# Patient Record
Sex: FEMALE | Race: WHITE | NOT HISPANIC OR LATINO | URBAN - METROPOLITAN AREA
[De-identification: names, ages, dates, MRNs, and addresses within clinical notes are randomized per-mention and may not be internally consistent; named-entity substitution may affect disease eponyms.]

---

## 2018-01-06 ENCOUNTER — EMERGENCY (EMERGENCY)
Facility: HOSPITAL | Age: 58
LOS: 0 days | Discharge: HOME | End: 2018-01-07
Attending: EMERGENCY MEDICINE | Admitting: EMERGENCY MEDICINE
Payer: COMMERCIAL

## 2018-01-06 VITALS — WEIGHT: 128.09 LBS | HEIGHT: 64 IN
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
## 2018-01-06 DIAGNOSIS — S09.90XA UNSPECIFIED INJURY OF HEAD, INITIAL ENCOUNTER: ICD-10-CM
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
## 2018-01-06 DIAGNOSIS — S89.81XA OTHER SPECIFIED INJURIES OF RIGHT LOWER LEG, INITIAL ENCOUNTER: ICD-10-CM

## 2018-01-06 DIAGNOSIS — S06.5X0A TRAUMATIC SUBDURAL HEMORRHAGE WITHOUT LOSS OF CONSCIOUSNESS, INITIAL ENCOUNTER: ICD-10-CM

## 2018-01-06 PROCEDURE — 70450 CT HEAD/BRAIN W/O DYE: CPT | Mod: 26

## 2018-01-06 PROCEDURE — 99285 EMERGENCY DEPT VISIT HI MDM: CPT | Mod: 25

## 2018-01-06 PROCEDURE — 73564 X-RAY EXAM KNEE 4 OR MORE: CPT | Mod: 26,RT

## 2018-01-06 RX ORDER — OXYCODONE AND ACETAMINOPHEN 5; 325 MG/1; MG/1
1 TABLET ORAL ONCE
Qty: 0 | Refills: 0 | Status: DISCONTINUED | OUTPATIENT
Start: 2018-01-06 | End: 2018-01-06

## 2018-01-06 RX ADMIN — OXYCODONE AND ACETAMINOPHEN 1 TABLET(S): 5; 325 TABLET ORAL at 21:22

## 2018-01-06 NOTE — ED STATDOCS - ENMT, MLM
No battles sign, no clinical evidence of facial injury, Neck is NT, AFROM. Nasal mucosa clear.  Mouth with slightly dry mucosa  Throat has no vesicles, no oropharyngeal exudates and uvula is midline.

## 2018-01-06 NOTE — ED STATDOCS - MUSCULOSKELETAL, MLM
range of motion is not limited and there is no muscle tenderness. No midline spinal ttp. Pelvis is NT, stable. R hip slightly ttp over the lateral aspect, no groin ttp. R knee +ttp over the patella, no effusion, AFROM, joints stable.

## 2018-01-06 NOTE — ED ADULT TRIAGE NOTE - CHIEF COMPLAINT QUOTE
Pt fell while ice skating yesterday, struck head and R knee.  No LOC, fall witnessed.  Pt has headache now.  No anticoagulants

## 2018-01-06 NOTE — ED STATDOCS - OBJECTIVE STATEMENT
56 y/o F with Hx of migraines, and depression presents to ED for evaluation s/p multiple falls while ice skating yesterday. Pt states while ice skating she slipped and fell striking her right knee once and her head the other time. No LOC. +HA. She was assisted to get up and was able to walk after the fall. No nausea, difficulty concentrating. +decreased appetite, neck soreness. Bi weakness, numbness, or tingling, incontinence. Sx are not similar to past migraines. 56 y/o F with Hx of migraines, and depression presents to ED for evaluation s/p multiple falls while ice skating yesterday. Pt states while ice skating she slipped and fell striking her right knee once and her head the other time. No LOC. +HA. She was assisted to get up and was able to walk after the fall. No nausea, difficulty concentrating. +decreased appetite, neck soreness.  No weakness, numbness, or tingling, incontinence. Sx are not similar to past migraines. 58 y/o F with Hx of migraines, and depression presents to ED for evaluation s/p couple falls while ice skating yesterday. Pt states while ice skating she slipped and fell striking her right knee once and her head the other time. No LOC. +HA. She was assisted to get up and was able to walk after the fall. No nausea, difficulty concentrating. +decreased appetite, neck soreness.  No weakness, numbness, or tingling, incontinence. Sx are not similar to past migraines.

## 2018-01-06 NOTE — ED STATDOCS - MEDICAL DECISION MAKING DETAILS
58 y/o F no anticoagulants, presents to ED c/o diffuse HA s/p slip on ice with impact to occiput and right patella pain. Neuro intact. Plan CT W/O Con, XR R knee and patella.

## 2018-01-06 NOTE — ED STATDOCS - SKIN, MLM
+nonacute scalp cysts, +scalp hematoma with intact overlying skin. +nonacute scalp cysts, +small occipital scalp hematoma with intact overlying skin.

## 2018-01-06 NOTE — ED STATDOCS - CARE PLAN
Principal Discharge DX:	Traumatic subdural hematoma without loss of consciousness, initial encounter  Secondary Diagnosis:	Injury of head, initial encounter  Secondary Diagnosis:	Fall from slipping on ice, initial encounter

## 2018-01-06 NOTE — ED STATDOCS - ATTENDING CONTRIBUTION TO CARE
I, Napoleon Womack MD, personally saw the patient with the PA, and completed the key components of the history and physical exam. I then discussed the management plan with the PA.  Dr. Womack: I have personally performed a face to face bedside history and physical examination of this patient. I have discussed the history, examination, review of systems, assessment and plan of management with the resident. I have reviewed the electronic medical record and amended it to reflect my history, review of systems, physical exam, assessment and plan.

## 2018-01-06 NOTE — ED ADULT NURSE NOTE - OBJECTIVE STATEMENT
Pt is s/p fall while ice skating, hitting back of head and knee; denies LOC, denies dizziness/lightheadedness, denies CP, SOB, denies N/V, denies visual disturbances. Neurocheck WNL: A&Ox4, PERRLA, GCS=15 Pt is s/p fall while ice skating, hitting back of head and knee; denies LOC, denies dizziness/lightheadedness, denies CP, SOB, denies N/V, denies visual disturbances. Neurocheck WNL: A&Ox4, PERRLA, GCS=15.

## 2018-01-06 NOTE — ED STATDOCS - PROGRESS NOTE DETAILS
Dr. Womack:  CT head verbal report: + small blood interhemispheric fissure.  written report still pending.  PA informed, to f/u written report & d/w SARTHAK. ABDULLAHI Lloyd:  Pt was seen and examined by intake provider. Pt presented with HA s/p multiple falls yesterday while ice skating. Denies any LOC. CT head positive for possible subdural/subarachnoid hemorrhage. d/w Dr. Taylor Lance on call, aware of the CT findings, since the incident occurred a day ago will get repeat CTH at [facility] and discuss with Dr. Vazquez. discuss with patient, aware of the CT findings and the plan. JW Pt signed out to me in stable condition in NAD.  Agree with history above.  Initial CT head read as possible subdural hematoma VS SAH.  Neurosurgery consulted prior to sign out recommended repeat CTH.  At this time VS stable WNL.  Physical Exam: General: alert and oriented x3 in no acute distress.  Cardiovascular: Regular rate and rhythm, S1 and S2 normal.  No murmurs, rubs, or gallops.  Pulses equal bilaterally.  No carotid bruits.  No peripheral edema or JVD.  Respiratory: No respiratory distress.  Lungs clear to auscultation bilaterally without adventitial breath sounds.  Neurological: AOx3 NAD.  Cranial nerves I-XII intact.  Normal gross motor and sensory function. Pt ambulatory. 5/5 muscular strength with normal bulk and tone.  DTRs 2+ bilaterally.  No dysmetria or dysdiadochokinesia.  No focal neurological deficit.  No neck stiffness, photophobia, rash, or meningismus.  +Palpable hematoma on the right lateral parietal scalp.  No obvious skull fracture or depression.   No ramirez sign, raccoon eyes, CSF rhinorrhea, CSF otorrhea, hematotympanum, or other sign of basilar skull fracture.  No maxillary or facial ecchymosis, hematoma, deformity, or palpable tenderness.   No septal hematoma. No midline cervical, thoracic, or lumbar tenderness, step off, fracture, or deformity.  No sign of thoracic trama.  No sign of abdominal or pelvic trauma.  No sign of extremity trauma.  A/P At this time Pt neurologically intact.  Repeat CT scan ordered.  Basic labs, additional medication, and precautions ordered.  Evaluation in progress. Dr. Womack:  Repeat head CT w/o interval change.  Paging SARTHAK on call. Dr. Womack:  Case d/w SARTHAK PA on call, who was informed of case by Dr. Vazquez.  Since repeat head CT w/o change, incident > 24 hrs. ago, & pt remains neuro intact, no indication for admission nor any acute SARTHAK intervention at this time.  Pt TBD for outpt SARTHAK f/u this upcoming week.     Reevaluated patient at bedside.  Patient feeling improved, HA down to 3/10 & easily manageable.  No neuro c/o's, repeat neuro exam intact.   Discussed the results of all diagnostic testing in ED and copies of all reports given.   An opportunity to ask questions was given.  Discussed the importance of prompt, close medical follow-up.  Patient will return with any changes, concerns or persistent / worsening symptoms.  Understanding of all instructions verbalized.

## 2018-01-06 NOTE — ED STATDOCS - NEUROLOGICAL, MLM
sensation is normal and strength is normal. sensation is normal and strength is normal.  A+O x 3.  CNs II - XII intact.  Normal speech.  No focal motor/sensory deficits.  Normal gait.

## 2018-01-07 VITALS
HEART RATE: 66 BPM | RESPIRATION RATE: 19 BRPM | DIASTOLIC BLOOD PRESSURE: 64 MMHG | OXYGEN SATURATION: 100 % | SYSTOLIC BLOOD PRESSURE: 118 MMHG

## 2018-01-07 LAB
ALBUMIN SERPL ELPH-MCNC: 4 G/DL — SIGNIFICANT CHANGE UP (ref 3.3–5)
ALP SERPL-CCNC: 91 U/L — SIGNIFICANT CHANGE UP (ref 40–120)
ALT FLD-CCNC: 20 U/L — SIGNIFICANT CHANGE UP (ref 12–78)
ANION GAP SERPL CALC-SCNC: 7 MMOL/L — SIGNIFICANT CHANGE UP (ref 5–17)
APTT BLD: 34.1 SEC — SIGNIFICANT CHANGE UP (ref 27.5–37.4)
AST SERPL-CCNC: 15 U/L — SIGNIFICANT CHANGE UP (ref 15–37)
BASOPHILS # BLD AUTO: 0.1 K/UL — SIGNIFICANT CHANGE UP (ref 0–0.2)
BASOPHILS NFR BLD AUTO: 1.5 % — SIGNIFICANT CHANGE UP (ref 0–2)
BILIRUB SERPL-MCNC: 0.3 MG/DL — SIGNIFICANT CHANGE UP (ref 0.2–1.2)
BUN SERPL-MCNC: 11 MG/DL — SIGNIFICANT CHANGE UP (ref 7–23)
CALCIUM SERPL-MCNC: 9.2 MG/DL — SIGNIFICANT CHANGE UP (ref 8.5–10.1)
CHLORIDE SERPL-SCNC: 107 MMOL/L — SIGNIFICANT CHANGE UP (ref 96–108)
CO2 SERPL-SCNC: 26 MMOL/L — SIGNIFICANT CHANGE UP (ref 22–31)
CREAT SERPL-MCNC: 0.85 MG/DL — SIGNIFICANT CHANGE UP (ref 0.5–1.3)
EOSINOPHIL # BLD AUTO: 0.2 K/UL — SIGNIFICANT CHANGE UP (ref 0–0.5)
EOSINOPHIL NFR BLD AUTO: 2.8 % — SIGNIFICANT CHANGE UP (ref 0–6)
GLUCOSE SERPL-MCNC: 94 MG/DL — SIGNIFICANT CHANGE UP (ref 70–99)
HCT VFR BLD CALC: 40.2 % — SIGNIFICANT CHANGE UP (ref 34.5–45)
HGB BLD-MCNC: 13.1 G/DL — SIGNIFICANT CHANGE UP (ref 11.5–15.5)
INR BLD: 0.91 RATIO — SIGNIFICANT CHANGE UP (ref 0.88–1.16)
LYMPHOCYTES # BLD AUTO: 2.3 K/UL — SIGNIFICANT CHANGE UP (ref 1–3.3)
LYMPHOCYTES # BLD AUTO: 42.4 % — SIGNIFICANT CHANGE UP (ref 13–44)
MCHC RBC-ENTMCNC: 30.9 PG — SIGNIFICANT CHANGE UP (ref 27–34)
MCHC RBC-ENTMCNC: 32.6 GM/DL — SIGNIFICANT CHANGE UP (ref 32–36)
MCV RBC AUTO: 94.8 FL — SIGNIFICANT CHANGE UP (ref 80–100)
MONOCYTES # BLD AUTO: 0.6 K/UL — SIGNIFICANT CHANGE UP (ref 0–0.9)
MONOCYTES NFR BLD AUTO: 10.3 % — SIGNIFICANT CHANGE UP (ref 2–14)
NEUTROPHILS # BLD AUTO: 2.4 K/UL — SIGNIFICANT CHANGE UP (ref 1.8–7.4)
NEUTROPHILS NFR BLD AUTO: 42.9 % — LOW (ref 43–77)
PLATELET # BLD AUTO: 243 K/UL — SIGNIFICANT CHANGE UP (ref 150–400)
POTASSIUM SERPL-MCNC: 3.6 MMOL/L — SIGNIFICANT CHANGE UP (ref 3.5–5.3)
POTASSIUM SERPL-SCNC: 3.6 MMOL/L — SIGNIFICANT CHANGE UP (ref 3.5–5.3)
PROT SERPL-MCNC: 6.9 GM/DL — SIGNIFICANT CHANGE UP (ref 6–8.3)
PROTHROM AB SERPL-ACNC: 9.8 SEC — SIGNIFICANT CHANGE UP (ref 9.8–12.7)
RBC # BLD: 4.24 M/UL — SIGNIFICANT CHANGE UP (ref 3.8–5.2)
RBC # FLD: 11.6 % — SIGNIFICANT CHANGE UP (ref 10.3–14.5)
SODIUM SERPL-SCNC: 140 MMOL/L — SIGNIFICANT CHANGE UP (ref 135–145)
WBC # BLD: 5.5 K/UL — SIGNIFICANT CHANGE UP (ref 3.8–10.5)
WBC # FLD AUTO: 5.5 K/UL — SIGNIFICANT CHANGE UP (ref 3.8–10.5)

## 2018-01-07 PROCEDURE — 70450 CT HEAD/BRAIN W/O DYE: CPT | Mod: 26

## 2018-01-07 PROCEDURE — 71045 X-RAY EXAM CHEST 1 VIEW: CPT | Mod: 26

## 2018-01-07 RX ORDER — OXYCODONE AND ACETAMINOPHEN 5; 325 MG/1; MG/1
1 TABLET ORAL ONCE
Qty: 0 | Refills: 0 | Status: DISCONTINUED | OUTPATIENT
Start: 2018-01-07 | End: 2018-01-07

## 2018-01-07 RX ADMIN — OXYCODONE AND ACETAMINOPHEN 1 TABLET(S): 5; 325 TABLET ORAL at 00:09

## 2018-01-21 ENCOUNTER — TRANSCRIPTION ENCOUNTER (OUTPATIENT)
Age: 58
End: 2018-01-21

## 2018-11-28 ENCOUNTER — EMERGENCY (EMERGENCY)
Facility: HOSPITAL | Age: 58
LOS: 0 days | Discharge: HOME | End: 2018-11-29
Attending: EMERGENCY MEDICINE
Payer: COMMERCIAL

## 2018-11-28 ENCOUNTER — EMERGENCY (EMERGENCY)
Facility: HOSPITAL | Age: 58
LOS: 0 days | Discharge: HOME | End: 2018-11-29
Attending: EMERGENCY MEDICINE | Admitting: EMERGENCY MEDICINE
Payer: COMMERCIAL

## 2018-11-28 VITALS
HEART RATE: 87 BPM | SYSTOLIC BLOOD PRESSURE: 106 MMHG | OXYGEN SATURATION: 93 % | DIASTOLIC BLOOD PRESSURE: 65 MMHG | RESPIRATION RATE: 18 BRPM | TEMPERATURE: 98 F

## 2018-11-28 VITALS
HEIGHT: 63 IN | RESPIRATION RATE: 18 BRPM | WEIGHT: 149.91 LBS | OXYGEN SATURATION: 95 % | SYSTOLIC BLOOD PRESSURE: 130 MMHG | TEMPERATURE: 98 F | HEART RATE: 113 BPM | DIASTOLIC BLOOD PRESSURE: 77 MMHG

## 2018-11-28 VITALS — WEIGHT: 158.95 LBS | HEIGHT: 69 IN
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
## 2018-11-28 DIAGNOSIS — R45.851 SUICIDAL IDEATIONS: ICD-10-CM

## 2018-11-28 DIAGNOSIS — T40.601A POISONING BY UNSPECIFIED NARCOTICS, ACCIDENTAL (UNINTENTIONAL), INITIAL ENCOUNTER: ICD-10-CM
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
## 2018-11-28 DIAGNOSIS — N39.0 URINARY TRACT INFECTION, SITE NOT SPECIFIED: ICD-10-CM

## 2018-11-28 PROBLEM — G43.909 MIGRAINE, UNSPECIFIED, NOT INTRACTABLE, WITHOUT STATUS MIGRAINOSUS: Chronic | Status: ACTIVE | Noted: 2018-01-07

## 2018-11-28 LAB
ALBUMIN SERPL ELPH-MCNC: 4.3 G/DL — SIGNIFICANT CHANGE UP (ref 3.3–5)
ALP SERPL-CCNC: 78 U/L — SIGNIFICANT CHANGE UP (ref 40–120)
ALT FLD-CCNC: 39 U/L — SIGNIFICANT CHANGE UP (ref 12–78)
AMPHET UR-MCNC: NEGATIVE — SIGNIFICANT CHANGE UP
ANION GAP SERPL CALC-SCNC: 15 MMOL/L — SIGNIFICANT CHANGE UP (ref 5–17)
APAP SERPL-MCNC: 74 UG/ML — HIGH (ref 10–30)
APPEARANCE UR: CLEAR — SIGNIFICANT CHANGE UP
AST SERPL-CCNC: 32 U/L — SIGNIFICANT CHANGE UP (ref 15–37)
BACTERIA # UR AUTO: ABNORMAL
BARBITURATES UR SCN-MCNC: NEGATIVE — SIGNIFICANT CHANGE UP
BASOPHILS # BLD AUTO: 0.06 K/UL — SIGNIFICANT CHANGE UP (ref 0–0.2)
BASOPHILS NFR BLD AUTO: 0.4 % — SIGNIFICANT CHANGE UP (ref 0–2)
BENZODIAZ UR-MCNC: NEGATIVE — SIGNIFICANT CHANGE UP
BILIRUB SERPL-MCNC: 0.2 MG/DL — SIGNIFICANT CHANGE UP (ref 0.2–1.2)
BILIRUB UR-MCNC: NEGATIVE — SIGNIFICANT CHANGE UP
BUN SERPL-MCNC: 10 MG/DL — SIGNIFICANT CHANGE UP (ref 7–23)
CALCIUM SERPL-MCNC: 9.1 MG/DL — SIGNIFICANT CHANGE UP (ref 8.5–10.1)
CHLORIDE SERPL-SCNC: 112 MMOL/L — HIGH (ref 96–108)
CO2 SERPL-SCNC: 21 MMOL/L — LOW (ref 22–31)
COCAINE METAB.OTHER UR-MCNC: NEGATIVE — SIGNIFICANT CHANGE UP
COLOR SPEC: YELLOW — SIGNIFICANT CHANGE UP
CREAT SERPL-MCNC: 1.31 MG/DL — HIGH (ref 0.5–1.3)
DIFF PNL FLD: ABNORMAL
EOSINOPHIL # BLD AUTO: 0.01 K/UL — SIGNIFICANT CHANGE UP (ref 0–0.5)
EOSINOPHIL NFR BLD AUTO: 0.1 % — SIGNIFICANT CHANGE UP (ref 0–6)
EPI CELLS # UR: ABNORMAL
ETHANOL SERPL-MCNC: 96 MG/DL — HIGH (ref 0–10)
GLUCOSE SERPL-MCNC: 57 MG/DL — LOW (ref 70–99)
GLUCOSE UR QL: NEGATIVE MG/DL — SIGNIFICANT CHANGE UP
HCT VFR BLD CALC: 44.9 % — SIGNIFICANT CHANGE UP (ref 34.5–45)
HGB BLD-MCNC: 14.5 G/DL — SIGNIFICANT CHANGE UP (ref 11.5–15.5)
IMM GRANULOCYTES NFR BLD AUTO: 0.9 % — SIGNIFICANT CHANGE UP (ref 0–1.5)
KETONES UR-MCNC: NEGATIVE — SIGNIFICANT CHANGE UP
LEUKOCYTE ESTERASE UR-ACNC: ABNORMAL
LYMPHOCYTES # BLD AUTO: 1.25 K/UL — SIGNIFICANT CHANGE UP (ref 1–3.3)
LYMPHOCYTES # BLD AUTO: 8.3 % — LOW (ref 13–44)
MCHC RBC-ENTMCNC: 32.3 GM/DL — SIGNIFICANT CHANGE UP (ref 32–36)
MCHC RBC-ENTMCNC: 32.4 PG — SIGNIFICANT CHANGE UP (ref 27–34)
MCV RBC AUTO: 100.4 FL — HIGH (ref 80–100)
METHADONE UR-MCNC: NEGATIVE — SIGNIFICANT CHANGE UP
MONOCYTES # BLD AUTO: 1.07 K/UL — HIGH (ref 0–0.9)
MONOCYTES NFR BLD AUTO: 7.1 % — SIGNIFICANT CHANGE UP (ref 2–14)
NEUTROPHILS # BLD AUTO: 12.5 K/UL — HIGH (ref 1.8–7.4)
NEUTROPHILS NFR BLD AUTO: 83.2 % — HIGH (ref 43–77)
NITRITE UR-MCNC: NEGATIVE — SIGNIFICANT CHANGE UP
NRBC # BLD: 0 /100 WBCS — SIGNIFICANT CHANGE UP (ref 0–0)
OPIATES UR-MCNC: POSITIVE — SIGNIFICANT CHANGE UP
PCP SPEC-MCNC: SIGNIFICANT CHANGE UP
PCP UR-MCNC: POSITIVE — SIGNIFICANT CHANGE UP
PH UR: 5 — SIGNIFICANT CHANGE UP (ref 5–8)
PLATELET # BLD AUTO: 282 K/UL — SIGNIFICANT CHANGE UP (ref 150–400)
POTASSIUM SERPL-MCNC: 4.1 MMOL/L — SIGNIFICANT CHANGE UP (ref 3.5–5.3)
POTASSIUM SERPL-SCNC: 4.1 MMOL/L — SIGNIFICANT CHANGE UP (ref 3.5–5.3)
PROT SERPL-MCNC: 7.9 GM/DL — SIGNIFICANT CHANGE UP (ref 6–8.3)
PROT UR-MCNC: 30 MG/DL
RBC # BLD: 4.47 M/UL — SIGNIFICANT CHANGE UP (ref 3.8–5.2)
RBC # FLD: 12.2 % — SIGNIFICANT CHANGE UP (ref 10.3–14.5)
RBC CASTS # UR COMP ASSIST: ABNORMAL /HPF (ref 0–4)
SALICYLATES SERPL-MCNC: <1.7 MG/DL — LOW (ref 2.8–20)
SODIUM SERPL-SCNC: 148 MMOL/L — HIGH (ref 135–145)
SP GR SPEC: 1.02 — SIGNIFICANT CHANGE UP (ref 1.01–1.02)
THC UR QL: POSITIVE — SIGNIFICANT CHANGE UP
UROBILINOGEN FLD QL: NEGATIVE MG/DL — SIGNIFICANT CHANGE UP
WBC # BLD: 15.02 K/UL — HIGH (ref 3.8–10.5)
WBC # FLD AUTO: 15.02 K/UL — HIGH (ref 3.8–10.5)
WBC UR QL: ABNORMAL

## 2018-11-28 PROCEDURE — 93010 ELECTROCARDIOGRAM REPORT: CPT

## 2018-11-28 PROCEDURE — 93010 ELECTROCARDIOGRAM REPORT: CPT | Mod: 76

## 2018-11-28 PROCEDURE — 99285 EMERGENCY DEPT VISIT HI MDM: CPT | Mod: 25

## 2018-11-28 PROCEDURE — 70450 CT HEAD/BRAIN W/O DYE: CPT | Mod: 26

## 2018-11-28 RX ORDER — ONDANSETRON 8 MG/1
4 TABLET, FILM COATED ORAL ONCE
Qty: 0 | Refills: 0 | Status: COMPLETED | OUTPATIENT
Start: 2018-11-28 | End: 2018-11-28

## 2018-11-28 RX ORDER — NITROFURANTOIN MACROCRYSTAL 50 MG
1 CAPSULE ORAL
Qty: 14 | Refills: 0 | OUTPATIENT
Start: 2018-11-28 | End: 2018-12-04

## 2018-11-28 RX ORDER — NITROFURANTOIN MACROCRYSTAL 50 MG
100 CAPSULE ORAL ONCE
Qty: 0 | Refills: 0 | Status: DISCONTINUED | OUTPATIENT
Start: 2018-11-28 | End: 2018-11-28

## 2018-11-28 RX ORDER — SODIUM CHLORIDE 9 MG/ML
1000 INJECTION INTRAMUSCULAR; INTRAVENOUS; SUBCUTANEOUS ONCE
Qty: 0 | Refills: 0 | Status: COMPLETED | OUTPATIENT
Start: 2018-11-28 | End: 2018-11-28

## 2018-11-28 RX ADMIN — ONDANSETRON 4 MILLIGRAM(S): 8 TABLET, FILM COATED ORAL at 03:24

## 2018-11-28 RX ADMIN — SODIUM CHLORIDE 1000 MILLILITER(S): 9 INJECTION INTRAMUSCULAR; INTRAVENOUS; SUBCUTANEOUS at 01:57

## 2018-11-28 RX ADMIN — SODIUM CHLORIDE 1000 MILLILITER(S): 9 INJECTION INTRAMUSCULAR; INTRAVENOUS; SUBCUTANEOUS at 03:06

## 2018-11-28 NOTE — ED ADULT NURSE NOTE - OBJECTIVE STATEMENT
Patient presents to ED after being found unresponsive by .  performed CPR on patient. EMS gave 2 NArcan and patient was arousable. Patient stated "why did you wake me up, I want to die"  1:1 initiated for safety.

## 2018-11-28 NOTE — ED ADULT TRIAGE NOTE - CHIEF COMPLAINT QUOTE
EMS found patient unresponsive with  performing CPR on patient.  pt was not in cardiac arrest but was cyanotic.  pt has hx of substance abuse, 2 Narcan given by EMS.  pt states "why did you wake me up, I want to die"  1:1 initiated for safety.

## 2018-11-28 NOTE — ED PROVIDER NOTE - PROGRESS NOTE DETAILS
pt and spouse told of results.    pt states feels better and denies any suicidal or homicidal ideas.  spouse states will take pt home.

## 2018-11-28 NOTE — ED PROVIDER NOTE - CARE PLAN
Principal Discharge DX:	Accidental drug overdose, initial encounter  Secondary Diagnosis:	Urinary tract infection with hematuria, site unspecified

## 2018-11-28 NOTE — ED PROVIDER NOTE - OBJECTIVE STATEMENT
57 y/o female in ED c/o OD tonight.   as per spouse, pt found unresponsive in her room.  spouse states pt with h/o drug and alcohol abuse.   as per EMS, pt was given narcan and woke up.  EMS states pt stated "why did you wake me up, I wanted to die".   pt denies any such statement.   pt denies any complaints at this time.   pt states "I feel fine".

## 2018-11-28 NOTE — ED ADULT NURSE NOTE - NSIMPLEMENTINTERV_GEN_ALL_ED
Implemented All Fall Risk Interventions:  [place] to call system. Call bell, personal items and telephone within reach. Instruct patient to call for assistance. Room bathroom lighting operational. Non-slip footwear when patient is off stretcher. Physically safe environment: no spills, clutter or unnecessary equipment. Stretcher in lowest position, wheels locked, appropriate side rails in place. Provide visual cue, wrist band, yellow gown, etc. Monitor gait and stability. Monitor for mental status changes and reorient to person, place, and time. Review medications for side effects contributing to fall risk. Reinforce activity limits and safety measures with patient and family.

## 2018-11-28 NOTE — ED ADULT TRIAGE NOTE - CHIEF COMPLAINT QUOTE
Pt brought to the ED by her family for suicide thoughts. Pt states that she had a bad night and had too much to drink. Pt admits to drinking a bottle of champagne. Pts family states that she wrote in a journal want to commit suicide, reasons why and plan on how to do it. Pt at this time is denying any thoughts of hurting herself or anyone else.

## 2018-11-28 NOTE — ED PROVIDER NOTE - NSFOLLOWUPINSTRUCTIONS_ED_ALL_ED_FT
please follow up with your doctor in 2-3 days.  take medication as prescribed.   drink plenty of fluids.   return to ED for any concerns

## 2018-11-28 NOTE — ED ADULT NURSE NOTE - OBJECTIVE STATEMENT
pt was seen in [facility] last night for SI and cleared by psych, BIB family this evening for continued concern. Pt denies SI/HI, denies ETOH use today. Pt states family brought her back because they're afraid to leave her home alone.

## 2018-11-29 VITALS
SYSTOLIC BLOOD PRESSURE: 121 MMHG | TEMPERATURE: 98 F | RESPIRATION RATE: 18 BRPM | DIASTOLIC BLOOD PRESSURE: 78 MMHG | HEART RATE: 74 BPM | OXYGEN SATURATION: 99 %

## 2018-11-29 DIAGNOSIS — F33.2 MAJOR DEPRESSIVE DISORDER, RECURRENT SEVERE WITHOUT PSYCHOTIC FEATURES: ICD-10-CM

## 2018-11-29 LAB
AMPHET UR-MCNC: NEGATIVE — SIGNIFICANT CHANGE UP
ANION GAP SERPL CALC-SCNC: 11 MMOL/L — SIGNIFICANT CHANGE UP (ref 5–17)
APAP SERPL-MCNC: <2 UG/ML — LOW (ref 10–30)
APPEARANCE UR: CLEAR — SIGNIFICANT CHANGE UP
BACTERIA # UR AUTO: ABNORMAL
BARBITURATES UR SCN-MCNC: NEGATIVE — SIGNIFICANT CHANGE UP
BASOPHILS # BLD AUTO: 0.03 K/UL — SIGNIFICANT CHANGE UP (ref 0–0.2)
BASOPHILS NFR BLD AUTO: 0.2 % — SIGNIFICANT CHANGE UP (ref 0–2)
BENZODIAZ UR-MCNC: NEGATIVE — SIGNIFICANT CHANGE UP
BILIRUB UR-MCNC: NEGATIVE — SIGNIFICANT CHANGE UP
BUN SERPL-MCNC: 15 MG/DL — SIGNIFICANT CHANGE UP (ref 7–23)
CALCIUM SERPL-MCNC: 8.1 MG/DL — LOW (ref 8.5–10.1)
CHLORIDE SERPL-SCNC: 102 MMOL/L — SIGNIFICANT CHANGE UP (ref 96–108)
CO2 SERPL-SCNC: 22 MMOL/L — SIGNIFICANT CHANGE UP (ref 22–31)
COCAINE METAB.OTHER UR-MCNC: NEGATIVE — SIGNIFICANT CHANGE UP
COLOR SPEC: YELLOW — SIGNIFICANT CHANGE UP
CREAT SERPL-MCNC: 0.82 MG/DL — SIGNIFICANT CHANGE UP (ref 0.5–1.3)
DIFF PNL FLD: ABNORMAL
EOSINOPHIL # BLD AUTO: 0 K/UL — SIGNIFICANT CHANGE UP (ref 0–0.5)
EOSINOPHIL NFR BLD AUTO: 0 % — SIGNIFICANT CHANGE UP (ref 0–6)
EPI CELLS # UR: ABNORMAL
ETHANOL SERPL-MCNC: <10 MG/DL — SIGNIFICANT CHANGE UP (ref 0–10)
GLUCOSE SERPL-MCNC: 97 MG/DL — SIGNIFICANT CHANGE UP (ref 70–99)
GLUCOSE UR QL: NEGATIVE MG/DL — SIGNIFICANT CHANGE UP
HCT VFR BLD CALC: 35.2 % — SIGNIFICANT CHANGE UP (ref 34.5–45)
HGB BLD-MCNC: 11.4 G/DL — LOW (ref 11.5–15.5)
IMM GRANULOCYTES NFR BLD AUTO: 0.6 % — SIGNIFICANT CHANGE UP (ref 0–1.5)
KETONES UR-MCNC: ABNORMAL
LEUKOCYTE ESTERASE UR-ACNC: ABNORMAL
LYMPHOCYTES # BLD AUTO: 1 K/UL — SIGNIFICANT CHANGE UP (ref 1–3.3)
LYMPHOCYTES # BLD AUTO: 7.2 % — LOW (ref 13–44)
MCHC RBC-ENTMCNC: 32.2 PG — SIGNIFICANT CHANGE UP (ref 27–34)
MCHC RBC-ENTMCNC: 32.4 GM/DL — SIGNIFICANT CHANGE UP (ref 32–36)
MCV RBC AUTO: 99.4 FL — SIGNIFICANT CHANGE UP (ref 80–100)
METHADONE UR-MCNC: NEGATIVE — SIGNIFICANT CHANGE UP
MONOCYTES # BLD AUTO: 0.7 K/UL — SIGNIFICANT CHANGE UP (ref 0–0.9)
MONOCYTES NFR BLD AUTO: 5.1 % — SIGNIFICANT CHANGE UP (ref 2–14)
NEUTROPHILS # BLD AUTO: 12 K/UL — HIGH (ref 1.8–7.4)
NEUTROPHILS NFR BLD AUTO: 86.9 % — HIGH (ref 43–77)
NITRITE UR-MCNC: NEGATIVE — SIGNIFICANT CHANGE UP
NRBC # BLD: 0 /100 WBCS — SIGNIFICANT CHANGE UP (ref 0–0)
OPIATES UR-MCNC: POSITIVE — SIGNIFICANT CHANGE UP
PCP SPEC-MCNC: SIGNIFICANT CHANGE UP
PCP UR-MCNC: NEGATIVE — SIGNIFICANT CHANGE UP
PH UR: 6 — SIGNIFICANT CHANGE UP (ref 5–8)
PLATELET # BLD AUTO: 175 K/UL — SIGNIFICANT CHANGE UP (ref 150–400)
POTASSIUM SERPL-MCNC: 4.5 MMOL/L — SIGNIFICANT CHANGE UP (ref 3.5–5.3)
POTASSIUM SERPL-SCNC: 4.5 MMOL/L — SIGNIFICANT CHANGE UP (ref 3.5–5.3)
PROT UR-MCNC: 30 MG/DL
RBC # BLD: 3.54 M/UL — LOW (ref 3.8–5.2)
RBC # FLD: 12.1 % — SIGNIFICANT CHANGE UP (ref 10.3–14.5)
RBC CASTS # UR COMP ASSIST: SIGNIFICANT CHANGE UP /HPF (ref 0–4)
SALICYLATES SERPL-MCNC: <1.7 MG/DL — LOW (ref 2.8–20)
SODIUM SERPL-SCNC: 135 MMOL/L — SIGNIFICANT CHANGE UP (ref 135–145)
SP GR SPEC: 1.02 — SIGNIFICANT CHANGE UP (ref 1.01–1.02)
THC UR QL: NEGATIVE — SIGNIFICANT CHANGE UP
UROBILINOGEN FLD QL: NEGATIVE MG/DL — SIGNIFICANT CHANGE UP
WBC # BLD: 13.81 K/UL — HIGH (ref 3.8–10.5)
WBC # FLD AUTO: 13.81 K/UL — HIGH (ref 3.8–10.5)
WBC UR QL: >50

## 2018-11-29 PROCEDURE — 93010 ELECTROCARDIOGRAM REPORT: CPT

## 2018-11-29 PROCEDURE — 90792 PSYCH DIAG EVAL W/MED SRVCS: CPT | Mod: GT

## 2018-11-29 RX ORDER — SUMATRIPTAN SUCCINATE 4 MG/.5ML
50 INJECTION, SOLUTION SUBCUTANEOUS ONCE
Qty: 0 | Refills: 0 | Status: COMPLETED | OUTPATIENT
Start: 2018-11-29 | End: 2018-11-29

## 2018-11-29 RX ORDER — IBUPROFEN 200 MG
600 TABLET ORAL ONCE
Qty: 0 | Refills: 0 | Status: COMPLETED | OUTPATIENT
Start: 2018-11-29 | End: 2018-11-29

## 2018-11-29 RX ORDER — CEPHALEXIN 500 MG
500 CAPSULE ORAL ONCE
Qty: 0 | Refills: 0 | Status: COMPLETED | OUTPATIENT
Start: 2018-11-29 | End: 2018-11-29

## 2018-11-29 RX ORDER — ONDANSETRON 8 MG/1
4 TABLET, FILM COATED ORAL ONCE
Qty: 0 | Refills: 0 | Status: COMPLETED | OUTPATIENT
Start: 2018-11-29 | End: 2018-11-29

## 2018-11-29 RX ADMIN — Medication 500 MILLIGRAM(S): at 10:10

## 2018-11-29 RX ADMIN — SUMATRIPTAN SUCCINATE 50 MILLIGRAM(S): 4 INJECTION, SOLUTION SUBCUTANEOUS at 08:23

## 2018-11-29 RX ADMIN — Medication 600 MILLIGRAM(S): at 03:11

## 2018-11-29 RX ADMIN — SUMATRIPTAN SUCCINATE 50 MILLIGRAM(S): 4 INJECTION, SOLUTION SUBCUTANEOUS at 09:23

## 2018-11-29 RX ADMIN — ONDANSETRON 4 MILLIGRAM(S): 8 TABLET, FILM COATED ORAL at 02:55

## 2018-11-29 NOTE — ED PROVIDER NOTE - PSYCHIATRIC, MLM
Alert and oriented to person, place, time/situation. depressed  mood and affect. risk to slef with suicidality, drank and took pills

## 2018-11-29 NOTE — ED BEHAVIORAL HEALTH ASSESSMENT NOTE - SUICIDE RISK FACTORS
Substance abuse/dependence/Mood episode/Access to means (pills, firearms, etc.)/Family history of suicide

## 2018-11-29 NOTE — ED BEHAVIORAL HEALTH ASSESSMENT NOTE - DETAILS
with  no safety concerns patient currently denying SI however patient had left suicide note earlier in day (see HPI) son  of suicide attempt/bipolar +headache  aware Emergency Department attending aware of hold

## 2018-11-29 NOTE — ED BEHAVIORAL HEALTH ASSESSMENT NOTE - DESCRIPTION (FIRST USE, LAST USE, QUANTITY, FREQUENCY, DURATION)
patient saying not drinking much however  says patient been drinking more wine than usual lately admits to use tramadol in overdose

## 2018-11-29 NOTE — ED BEHAVIORAL HEALTH ASSESSMENT NOTE - SUBSTANCE ISSUES AND PLAN (INCLUDE STANDING AND PRN MEDICATION)
monitor for withdrawal as patient unclear intake of daily ETOH; ativan prn 2mg q4hr for signs of withdrawal given hx of alcohol abuse/use, will order CIWA q 4 hours, vitals q 4 hours, Symptom-triggered Ativan 2mg q 4 hours for CIWA score over 8. Provide Folic Acid 1mg qd, Multivitamin with minerals qd, Thiamine 1 tablet qd x 3 days.

## 2018-11-29 NOTE — CHART NOTE - NSCHARTNOTEFT_GEN_A_CORE
ELIANA Greer, [facility] [place], called [facility] at [phone] spoke to Michelle FRENCH to provide clinical for authorization for [facility]. Pt was authorized for 2 days 11/29/18 - 12/01/18, authorization #[id number]. Pt is aware of transfer.

## 2018-11-29 NOTE — ED BEHAVIORAL HEALTH ASSESSMENT NOTE - OTHER PAST PSYCHIATRIC HISTORY (INCLUDE DETAILS REGARDING ONSET, COURSE OF ILLNESS, INPATIENT/OUTPATIENT TREATMENT)
hx of suicide attempt in past  hx of hospitalizations  in outpatient treatment with private practice NP

## 2018-11-29 NOTE — ED BEHAVIORAL HEALTH ASSESSMENT NOTE - SUMMARY
58 year old  female, domiciled with family, works at [employer], past psych hx of depression since childhood, 1 or 2 prior suicide attempts with hospitalization by overdose, however no recent admissions, hx of suicide attempt in family member, admits to cannabis use, with suicide attempt on presentation as discovered by family members who found suicide note in context of substance intoxication and recent social stressors including losing friends to cancer and work stress.    Patient with worsening depressive symptoms due to social stressors and increased alcohol use with attempted suicide attempt with note left for family. Will hold for psychiatric bed to stabilize and monitor for safety.  requesting admission and patient meets criteria for involuntary admission if unwilling to sign voluntary.

## 2018-11-29 NOTE — ED ADULT NURSE REASSESSMENT NOTE - NS ED NURSE REASSESS COMMENT FT1
Pt received in stretcher. Handoff given by MARINA Giang. A&Ox3. Calm and cooperative. C/O HA. Vomited x 1. Medicated as ordered and tolerated well. MAEx4. VS as documented. To be seen by psych. 1:1 maintained for safety. All needs are met and safety maintained. Will continue to monitor.

## 2018-11-29 NOTE — ED ADULT NURSE REASSESSMENT NOTE - NS ED NURSE REASSESS COMMENT FT1
report received from previous RN. patient resting comfortably in stretcher in no acute distress watching tv with CO in place. patient reports being unable to sleep last night. complains of migraine headache 8/10. patient states IBU last night did not help. patient is requesting Imitrex as she takes it at home. Dr. Scott aware. awaiting further orders. denies SI, HI and hallucinations. patient calm and cooperative. comfort and safety maintained. patient to receive psych re-evaluation this morning.

## 2018-11-29 NOTE — ED BEHAVIORAL HEALTH ASSESSMENT NOTE - RISK ASSESSMENT
high imminent risk given active worsening substance and mood, suicide attempt attempted prior to admission, family hx and personal hx of suicide attempt

## 2018-11-29 NOTE — ED BEHAVIORAL HEALTH ASSESSMENT NOTE - HPI (INCLUDE ILLNESS QUALITY, SEVERITY, DURATION, TIMING, CONTEXT, MODIFYING FACTORS, ASSOCIATED SIGNS AND SYMPTOMS)
58 year old  female, domiciled with family, works at  practice, past psych hx of depression since childhood, 1 or 2 prior suicide attempts with hospitalization by overdose, however no recent admissions, hx of suicide attempt in family member, admits to cannabis use, with suicide attempt on presentation as discovered by family members who found suicide note in context of substance intoxication and recent social stressors including losing friends to cancer and work stress.    Patient reports that she has been feeling down due to losing friends to cancer. Admits worsening depression x1 mo feeling "fed up." States she took tramadol pills she brought home from  office, "many of them," while drinking in attempt for suicide. Patient states reflecting back "this was stupid" and also wrote suicide note to family. Family per report found the note and returned patient to Emergency Department as she was previously discharged earlier same day after clearance from intoxication. Patient states her sleep has been fine as she is compliant with psych medications. Denies recent changes in meds or side effects. Denies current suicidality and does not think psychiatric admission would be beneficial. Reports cannabis use. Claims to not drink much alcohol.     Spoke to  Ruben [phone] who reports safety concern after finding suicide note left by patient. States wife minimized her intoxicated state stating earlier in day it was not for suicide but later family found note and feel concerned that patient is not being honest with symptoms/severity of suicide attempt. Report patient has been more depressed lately due to friends' death from cancer and has been drinking more lately than usual. Reports hx of suicide attempt in past. Unclear of her current substance use. States her job is very stressful and feels she is too overwhelmed. Would like patient hospitalized for safety.

## 2018-11-29 NOTE — ED PROVIDER NOTE - PROGRESS NOTE DETAILS
spoke to telepsych pt kely Mack DO spoke with telepsych pt tba, currently psych hold for a bed B Martina DO pt being transferred to [facility].

## 2018-11-29 NOTE — ED ADULT NURSE REASSESSMENT NOTE - NS ED NURSE REASSESS COMMENT FT1
patient resting comfortably in stretcher in no acute distress with CO maintained. no complaints at this time. patient awaiting transfer to [facility] for admission. comfort and safety maintained.

## 2018-11-29 NOTE — ED BEHAVIORAL HEALTH ASSESSMENT NOTE - DESCRIPTION
patient earlier in course had been discharged due to denial of active SI however family returned patient to Emergency Department after finding suicide note. patient denying SI in Emergency Department. cooperative and calm with procedures. 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

## 2018-11-29 NOTE — ED PROVIDER NOTE - OBJECTIVE STATEMENT
59 yo female bib family again today for suicidality. pt was seen and cleared by psychiatry today. family member took her home then they found a suicide note and brought her back to the emergency department. pt states she drank champagne and took a tramadol.

## 2018-11-29 NOTE — ED ADULT NURSE REASSESSMENT NOTE - NS ED NURSE REASSESS COMMENT FT1
Pt in stretcher. Calm and cooperative. VS as documented. Was seen w/ telepsych. pending result. 1:1 maintained for safety. All needs are met and safety maintained. Will continue to monitor.

## 2019-02-20 ENCOUNTER — OUTPATIENT (OUTPATIENT)
Dept: OUTPATIENT SERVICES | Facility: HOSPITAL | Age: 59
LOS: 1 days | End: 2019-02-20
Payer: COMMERCIAL

## 2019-02-20 ENCOUNTER — APPOINTMENT (OUTPATIENT)
Dept: MAMMOGRAPHY | Facility: CLINIC | Age: 59
End: 2019-02-20
Payer: COMMERCIAL

## 2019-02-20 DIAGNOSIS — Z00.8 ENCOUNTER FOR OTHER GENERAL EXAMINATION: ICD-10-CM

## 2019-02-20 PROCEDURE — 77067 SCR MAMMO BI INCL CAD: CPT | Mod: 26

## 2019-02-20 PROCEDURE — 77063 BREAST TOMOSYNTHESIS BI: CPT | Mod: 26

## 2019-02-20 PROCEDURE — 77063 BREAST TOMOSYNTHESIS BI: CPT

## 2019-02-20 PROCEDURE — 77067 SCR MAMMO BI INCL CAD: CPT

## 2019-03-01 ENCOUNTER — APPOINTMENT (OUTPATIENT)
Dept: MAMMOGRAPHY | Facility: CLINIC | Age: 59
End: 2019-03-01
Payer: COMMERCIAL

## 2019-03-01 ENCOUNTER — APPOINTMENT (OUTPATIENT)
Dept: ULTRASOUND IMAGING | Facility: CLINIC | Age: 59
End: 2019-03-01
Payer: COMMERCIAL

## 2019-03-01 ENCOUNTER — OUTPATIENT (OUTPATIENT)
Dept: OUTPATIENT SERVICES | Facility: HOSPITAL | Age: 59
LOS: 1 days | End: 2019-03-01
Payer: COMMERCIAL

## 2019-03-01 DIAGNOSIS — Z00.8 ENCOUNTER FOR OTHER GENERAL EXAMINATION: ICD-10-CM

## 2019-03-01 PROCEDURE — 77066 DX MAMMO INCL CAD BI: CPT | Mod: 26

## 2019-03-01 PROCEDURE — G0279: CPT | Mod: 26

## 2019-03-01 PROCEDURE — 77066 DX MAMMO INCL CAD BI: CPT

## 2019-03-01 PROCEDURE — G0279: CPT

## 2019-03-05 ENCOUNTER — APPOINTMENT (OUTPATIENT)
Dept: ULTRASOUND IMAGING | Facility: CLINIC | Age: 59
End: 2019-03-05

## 2020-05-06 NOTE — ED ADULT NURSE NOTE - CAS TRG GENERAL AIRWAY, MLM
[facility] with [facility] for [facility]. Inquire why appt was cancelled. Informed the diagnosis was kidney disease which we don't treat and referral needs to go to nephrology. She states the diagnosis is actually kidney cyst found on imaging. Advised to fax over the results, to be given to MD to advise and will contact her back with advisement. She verbally understood.  
Patent

## 2021-06-04 NOTE — ED BEHAVIORAL HEALTH ASSESSMENT NOTE - ACCOMPANIED BY
Self/Family member The resident's documentation has been prepared under my direction and personally reviewed by me in its entirety. I confirm that the note above accurately reflects all work, treatment, procedures, and medical decision making performed by me.

## 2023-12-29 NOTE — ED ADULT NURSE NOTE - CAS DISCH TRANSFER METHOD
Subjective   Above noted.   Subjective   Review of Systems   Unable to perform ROS: Dementia         Objective     Objective   Visit Vitals  /76 (BP Location: LUE - Left upper extremity, Patient Position: Supine)   Pulse 89   Temp 96.4 °F (35.8 °C) (Axillary)   Resp 18   Ht 5' 10\" (1.778 m)   Wt 107.9 kg (237 lb 14 oz)   SpO2 96%   BMI 34.13 kg/m²     Oxygen flowing at 6 L/min    Physical Exam   No Respiratory distress.  He was lethargic.   Conjunctiva pink and sclera anicteric. No JVD. Breath sounds With rhonchi. Heart rhythm regular. Abdomen soft and nontender. No hepatosplenomegaly. Skin is warm, dry, and intact. No clubbing, cyanosis, or significant edema.        I/O's       Intake/Output Summary (Last 24 hours) at 12/29/2023 1353  Last data filed at 12/28/2023 1753  Gross per 24 hour   Intake 986.92 ml   Output --   Net 986.92 ml         Labs     Recent Labs   Lab 12/28/23  0503 12/26/23  0641 12/25/23  0509   WBC 6.4 5.8 6.7   HCT 35.2* 36.8* 34.2*   HGB 11.1* 11.4* 10.4*    205 203     Recent Labs   Lab 12/28/23  0503 12/27/23  0530 12/26/23  0641 12/23/23  0451 12/22/23  2102   SODIUM 146* 144 145   < > 142   POTASSIUM 3.1* 3.5 3.3*   < > 3.8   CHLORIDE 101 100 101   < > 97   CO2 44* 43* 40*   < > 45*   GLUCOSE 82 77 80   < > 142*   BUN 8 7 6   < > 26*   CREATININE 0.44* 0.50* 0.38*   < > 1.01   INR  --   --   --   --  1.2    < > = values in this interval not displayed.         Imaging     XR CHEST AP OR PA   Final Result   1. Increasing consolidation and effusions at both lung bases right greater   than left.      Electronically Signed by: SONJA PHAM M.D.    Signed on: 12/26/2023 11:26 AM    Workstation ID: [id number]      CT HEAD WO CONTRAST   Final Result   1.   Study is limited by motion.    2.   No obvious bleed. No midline shift.   3.   Opacified bilateral mastoid air cells. Correlate for mastoiditis.         Noncontrast CT of the brain is a screening survey.  Conditions such as    vascular malformations, aneurysm, low grade tumors, meningitis, subtle   subarachnoid hemorrhage, etc., may not be demonstrated.  Follow-up studies   as clinically indicated may be necessary.      FOR PHYSICIAN USE ONLY: Please note that this report was generated using   voice recognition software.  If you require clarification or feel that   there has been an error in this report please contact me.            Electronically Signed by: MARITO RO D.O.    Signed on: 12/24/2023 3:24 PM    Workstation ID: [id number]      XR CHEST PA OR AP 1 VIEW   Final Result      1. Stable enlargement of the cardiomediastinal silhouette with mild   improvement in pulmonary congestion.   2. Decreasing size of the small layering right pleural effusion with   adjacent right basilar airspace disease.   3. Similar moderate layering left pleural effusion with adjacent left   basilar airspace disease.            Electronically Signed by: MINDY KOO M.D.    Signed on: 12/22/2023 10:15 PM    Workstation ID: [id number]            Diagnosis   Chronic respiratory failure with hypercapnia   --Possible acute component.  ---ABG 12/23/23: pH 7.36, pCO2 90, pO2 72     At risk for aspiration      Pleural effusions    -- CXR 12/26 with increasing consolidation and effusions at both lung bases R>L     Dementia      AMS in the setting of dementia  -- H/o stroke on AC  Urinary tract infection  EDMUNDO     Plan   Diuretics  Potassium replacement  Noninvasive positive pressure ventilation for sleep and as needed.    Follow-up ABG as needed  Keep O2 saturation 92 to 95%  Empiric antibiotics  Albuterol by nebulizer as needed  Hospice Meeting To be arranged.    DVT Prophylaxis  enoxaparin - 40 MG/0.4ML      Inpatient Medications     Current Facility-Administered Medications   Medication    Potassium Standard Replacement Protocol (Levels 3.5 and lower)    Magnesium Standard Replacement Protocol    Phosphorus Standard Replacement Protocol    sodium  chloride 0.9 % flush bag 25 mL    morphine injection 2 mg    LORazepam (ATIVAN) injection 0.5 mg    furosemide (LASIX INJECT) injection 20 mg    [Held by provider] enoxaparin (LOVENOX) injection 40 mg    LORazepam (ATIVAN) injection 1 mg    acetaminophen (TYLENOL) tablet 650 mg    Or    acetaminophen (TYLENOL) suppository 650 mg    ondansetron (ZOFRAN ODT) disintegrating tablet 4 mg    Or    ondansetron (ZOFRAN) injection 4 mg    [Held by provider] sodium chloride 0.9% infusion    piperacillin-tazobactam (ZOSYN) 3.375 g in sodium chloride 0.9 % 100 mL IVPB    sodium chloride 0.9 % flush bag 25 mL    sodium chloride 0.9 % injection 2 mL      Private car

## 2024-01-16 NOTE — ED BEHAVIORAL HEALTH ASSESSMENT NOTE - ESTIMATED INTELLIGENCE
[facility] Nurse Msg Pool  Revlimid 25mg capsule Re-auth -  Approved - Ready To Fill       Authorization Number: [id number] ([employer])  Valid from 9/22/23 to 9/21/24     Please send script to [facility]   [address] [place], [place] [id number]     Pharmacy Coverage: [facility]  Patient's Co-Pay: $65     Co pay Assistance Information  Patient already enrolled in a drug copay card  Copay after assistance is: $25.     Additional Information:  Patient has returned call, provided approval information.  Message sent to provider.    Evelia Sher 1/16/24  
Average

## 2024-06-05 NOTE — ED STATDOCS - RESPIRATORY, MLM
Diagnoses and all orders for this visit:    Tension headache  -     Amb External Referral To Neurology    This is a referral through [facility].     breath sounds clear and equal bilaterally.